# Patient Record
Sex: MALE | Race: OTHER
[De-identification: names, ages, dates, MRNs, and addresses within clinical notes are randomized per-mention and may not be internally consistent; named-entity substitution may affect disease eponyms.]

---

## 2021-05-20 ENCOUNTER — HOSPITAL ENCOUNTER (EMERGENCY)
Dept: HOSPITAL 8 - ED | Age: 36
Discharge: HOME | End: 2021-05-20
Payer: SELF-PAY

## 2021-05-20 VITALS — DIASTOLIC BLOOD PRESSURE: 69 MMHG | SYSTOLIC BLOOD PRESSURE: 107 MMHG

## 2021-05-20 VITALS — HEIGHT: 72 IN | BODY MASS INDEX: 25.92 KG/M2 | WEIGHT: 191.36 LBS

## 2021-05-20 DIAGNOSIS — M94.0: ICD-10-CM

## 2021-05-20 DIAGNOSIS — R07.89: Primary | ICD-10-CM

## 2021-05-20 DIAGNOSIS — I10: ICD-10-CM

## 2021-05-20 DIAGNOSIS — R94.31: ICD-10-CM

## 2021-05-20 LAB
ALBUMIN SERPL-MCNC: 3.6 G/DL (ref 3.4–5)
ANION GAP SERPL CALC-SCNC: 5 MMOL/L (ref 5–15)
BASOPHILS # BLD AUTO: 0.1 X10^3/UL (ref 0–0.1)
BASOPHILS NFR BLD AUTO: 1 % (ref 0–1)
CALCIUM SERPL-MCNC: 8.5 MG/DL (ref 8.5–10.1)
CHLORIDE SERPL-SCNC: 111 MMOL/L (ref 98–107)
CREAT SERPL-MCNC: 0.88 MG/DL (ref 0.7–1.3)
EOSINOPHIL # BLD AUTO: 0.1 X10^3/UL (ref 0–0.4)
EOSINOPHIL NFR BLD AUTO: 2 % (ref 1–7)
ERYTHROCYTE [DISTWIDTH] IN BLOOD BY AUTOMATED COUNT: 13.3 % (ref 9.4–14.8)
LYMPHOCYTES # BLD AUTO: 2 X10^3/UL (ref 1–3.4)
LYMPHOCYTES NFR BLD AUTO: 27 % (ref 22–44)
MCH RBC QN AUTO: 29.2 PG (ref 27.5–34.5)
MCHC RBC AUTO-ENTMCNC: 34.3 G/DL (ref 33.2–36.2)
MD: NO
MONOCYTES # BLD AUTO: 0.5 X10^3/UL (ref 0.2–0.8)
MONOCYTES NFR BLD AUTO: 7 % (ref 2–9)
NEUTROPHILS # BLD AUTO: 4.7 X10^3/UL (ref 1.8–6.8)
NEUTROPHILS NFR BLD AUTO: 64 % (ref 42–75)
PLATELET # BLD AUTO: 316 X10^3/UL (ref 130–400)
PMV BLD AUTO: 6.9 FL (ref 7.4–10.4)
RBC # BLD AUTO: 5.15 X10^6/UL (ref 4.38–5.82)
TROPONIN I SERPL-MCNC: < 0.015 NG/ML (ref 0–0.04)

## 2021-05-20 PROCEDURE — 71045 X-RAY EXAM CHEST 1 VIEW: CPT

## 2021-05-20 PROCEDURE — 36415 COLL VENOUS BLD VENIPUNCTURE: CPT

## 2021-05-20 PROCEDURE — 84484 ASSAY OF TROPONIN QUANT: CPT

## 2021-05-20 PROCEDURE — 93005 ELECTROCARDIOGRAM TRACING: CPT

## 2021-05-20 PROCEDURE — 82040 ASSAY OF SERUM ALBUMIN: CPT

## 2021-05-20 PROCEDURE — 85025 COMPLETE CBC W/AUTO DIFF WBC: CPT

## 2021-05-20 PROCEDURE — 80048 BASIC METABOLIC PNL TOTAL CA: CPT

## 2021-05-20 PROCEDURE — 99285 EMERGENCY DEPT VISIT HI MDM: CPT

## 2021-05-20 NOTE — NUR
PT SPEAKS LITTLE ENGLISH: ERMD AT BEDSIDE AND OBTAINED HISTORY. PT PRESENTED TO 
ED D/T CP STARTING LAST NIGHT. STATES RADIATES TO LEFT ARM. DENIES ANY HX. 
DENIES TAKING ANY MEDICATION. WIFE AT BEDSIDE. CYRACOM IN ROOM IF NEEDED FOR 
TRANSLATION.

## 2021-05-20 NOTE — NUR
D/C INSTRUCTIONS, MEDS & F/U APPT RV'WD WITH PT USING . PT 
VERBALIZES UNDERSTANDING. RX GIVEN X1. PT AMBULATED OUT OF ED WITHOUT 
DIFFICULTY.